# Patient Record
Sex: MALE | HISPANIC OR LATINO | Employment: FULL TIME | URBAN - NONMETROPOLITAN AREA
[De-identification: names, ages, dates, MRNs, and addresses within clinical notes are randomized per-mention and may not be internally consistent; named-entity substitution may affect disease eponyms.]

---

## 2022-04-18 ENCOUNTER — OFFICE VISIT (OUTPATIENT)
Dept: INTERNAL MEDICINE | Facility: CLINIC | Age: 22
End: 2022-04-18

## 2022-04-18 VITALS
HEIGHT: 68 IN | HEART RATE: 93 BPM | OXYGEN SATURATION: 98 % | DIASTOLIC BLOOD PRESSURE: 78 MMHG | BODY MASS INDEX: 23.01 KG/M2 | SYSTOLIC BLOOD PRESSURE: 160 MMHG | RESPIRATION RATE: 20 BRPM | TEMPERATURE: 98.4 F | WEIGHT: 151.8 LBS

## 2022-04-18 DIAGNOSIS — I10 HYPERTENSION, UNSPECIFIED TYPE: Primary | ICD-10-CM

## 2022-04-18 DIAGNOSIS — Z11.59 ENCOUNTER FOR HEPATITIS C SCREENING TEST FOR LOW RISK PATIENT: ICD-10-CM

## 2022-04-18 PROCEDURE — 99203 OFFICE O/P NEW LOW 30 MIN: CPT | Performed by: NURSE PRACTITIONER

## 2022-04-18 RX ORDER — HYDROCHLOROTHIAZIDE 25 MG/1
25 TABLET ORAL DAILY
Qty: 30 TABLET | Refills: 6 | Status: SHIPPED | OUTPATIENT
Start: 2022-04-18 | End: 2022-07-05

## 2022-04-18 NOTE — PROGRESS NOTES
Subjective     Chief Complaint   Patient presents with   • Establish Care       History of Present Illness  Patient presents today for establishment of care and high blood pressure readings. He has just been hired at [employer] and had  A preemployment screening. His blood pressure was found to be high. Patient reports that he does have high blood pressure when he goes to the doctor's office but will be normal when he gets home. He does have readings at home of 117-126 systolic. He denies any headaches, blurred vision, dizziness, or weakness. No chest pain. No posterior headaches.     Patient's PMR from outside medical facility reviewed and noted.    Review of Systems   Constitutional: Negative for appetite change, chills, diaphoresis, fatigue, fever and unexpected weight change.   HENT: Negative for congestion, ear pain, postnasal drip, sore throat and trouble swallowing.    Eyes: Negative for pain and visual disturbance.   Respiratory: Negative for cough, chest tightness, shortness of breath and wheezing.    Cardiovascular: Negative for chest pain, palpitations and leg swelling.   Gastrointestinal: Negative for abdominal pain, blood in stool, constipation, diarrhea, nausea and vomiting.   Endocrine: Negative for cold intolerance, heat intolerance, polydipsia, polyphagia and polyuria.   Genitourinary: Negative for difficulty urinating, dysuria, frequency and urgency.   Musculoskeletal: Negative for gait problem.   Skin: Negative for rash.   Neurological: Negative for dizziness, seizures, syncope, weakness and headaches.   Hematological: Does not bruise/bleed easily.   Psychiatric/Behavioral: Negative for confusion. The patient is not nervous/anxious.         Otherwise complete ROS reviewed and negative except as mentioned in the HPI.    Past Medical History: History reviewed. No pertinent past medical history.  Past Surgical History:History reviewed. No pertinent surgical history.  Social History:   "reports that he has never smoked. He has never used smokeless tobacco. He reports current alcohol use. He reports that he does not use drugs.    Family History: family history includes No Known Problems in his brother, father, and mother.       Allergies:  No Known Allergies  Medications:  Prior to Admission medications    Not on File       Objective     Vital Signs: /78   Pulse 93   Temp 98.4 °F (36.9 °C) (Temporal)   Resp 20   Ht 172.7 cm (68\")   Wt 68.9 kg (151 lb 12.8 oz)   SpO2 98%   BMI 23.08 kg/m²   Physical Exam  Vitals and nursing note reviewed.   Constitutional:       Appearance: He is well-developed.   HENT:      Head: Normocephalic and atraumatic.   Eyes:      Pupils: Pupils are equal, round, and reactive to light.   Neck:      Vascular: No JVD.   Cardiovascular:      Rate and Rhythm: Normal rate and regular rhythm.   Pulmonary:      Effort: Pulmonary effort is normal.   Abdominal:      General: Bowel sounds are normal.      Palpations: Abdomen is soft.   Musculoskeletal:         General: No deformity.      Cervical back: Normal range of motion and neck supple.   Lymphadenopathy:      Cervical: No cervical adenopathy.   Skin:     General: Skin is warm and dry.   Neurological:      Mental Status: He is alert and oriented to person, place, and time.   Psychiatric:         Behavior: Behavior normal.         Thought Content: Thought content normal.         Judgment: Judgment normal.         Patient's Body mass index is 23.08 kg/m². indicating that he is within normal range (BMI 18.5-24.9). No BMI management plan needed..      Results Reviewed:  No results found for: GLUCOSE, BUN, CREATININE, NA, K, CL, CO2, CALCIUM, ALT, AST, WBC, HCT, PLT, CHOL, TRIG, HDL, LDL, LDLHDL, HGBA1C      Assessment / Plan     Assessment/Plan:  Diagnoses and all orders for this visit:    1. Hypertension, unspecified type (Primary)  -     CBC & Differential  -     Comprehensive Metabolic Panel  -     TSH  -     T4, " free  -     hydroCHLOROthiazide (HYDRODIURIL) 25 MG tablet; Take 1 tablet by mouth Daily.  Dispense: 30 tablet; Refill: 6    2. Encounter for hepatitis C screening test for low risk patient  -     Hepatitis C Antibody        Return in about 1 month (around 5/18/2022). unless patient needs to be seen sooner or acute issues arise.    Code Status: Full.     I have discussed the patient results/orders and and plan/recommendation with them at today's visit.      Angie Matute, LONG   04/18/2022

## 2022-04-20 LAB
ALBUMIN SERPL-MCNC: 5 G/DL (ref 4.1–5.2)
ALBUMIN/GLOB SERPL: 1.5 {RATIO} (ref 1.2–2.2)
ALP SERPL-CCNC: 99 IU/L (ref 44–121)
ALT SERPL-CCNC: 30 IU/L (ref 0–44)
AST SERPL-CCNC: 17 IU/L (ref 0–40)
BASOPHILS # BLD AUTO: 0 X10E3/UL (ref 0–0.2)
BASOPHILS NFR BLD AUTO: 0 %
BILIRUB SERPL-MCNC: 0.3 MG/DL (ref 0–1.2)
BUN SERPL-MCNC: 12 MG/DL (ref 6–20)
BUN/CREAT SERPL: 17 (ref 9–20)
CALCIUM SERPL-MCNC: 9.9 MG/DL (ref 8.7–10.2)
CHLORIDE SERPL-SCNC: 99 MMOL/L (ref 96–106)
CO2 SERPL-SCNC: 23 MMOL/L (ref 20–29)
CREAT SERPL-MCNC: 0.71 MG/DL (ref 0.76–1.27)
EGFRCR SERPLBLD CKD-EPI 2021: 134 ML/MIN/1.73
EOSINOPHIL # BLD AUTO: 0.1 X10E3/UL (ref 0–0.4)
EOSINOPHIL NFR BLD AUTO: 1 %
ERYTHROCYTE [DISTWIDTH] IN BLOOD BY AUTOMATED COUNT: 13.1 % (ref 11.6–15.4)
GLOBULIN SER CALC-MCNC: 3.3 G/DL (ref 1.5–4.5)
GLUCOSE SERPL-MCNC: 87 MG/DL (ref 65–99)
HCT VFR BLD AUTO: 50.7 % (ref 37.5–51)
HCV AB S/CO SERPL IA: <0.1 S/CO RATIO (ref 0–0.9)
HGB BLD-MCNC: 16.5 G/DL (ref 13–17.7)
IMM GRANULOCYTES # BLD AUTO: 0 X10E3/UL (ref 0–0.1)
IMM GRANULOCYTES NFR BLD AUTO: 0 %
LYMPHOCYTES # BLD AUTO: 2.3 X10E3/UL (ref 0.7–3.1)
LYMPHOCYTES NFR BLD AUTO: 30 %
MCH RBC QN AUTO: 28.8 PG (ref 26.6–33)
MCHC RBC AUTO-ENTMCNC: 32.5 G/DL (ref 31.5–35.7)
MCV RBC AUTO: 89 FL (ref 79–97)
MONOCYTES # BLD AUTO: 0.4 X10E3/UL (ref 0.1–0.9)
MONOCYTES NFR BLD AUTO: 6 %
NEUTROPHILS # BLD AUTO: 4.8 X10E3/UL (ref 1.4–7)
NEUTROPHILS NFR BLD AUTO: 63 %
PLATELET # BLD AUTO: 318 X10E3/UL (ref 150–450)
POTASSIUM SERPL-SCNC: 4.3 MMOL/L (ref 3.5–5.2)
PROT SERPL-MCNC: 8.3 G/DL (ref 6–8.5)
RBC # BLD AUTO: 5.73 X10E6/UL (ref 4.14–5.8)
SODIUM SERPL-SCNC: 139 MMOL/L (ref 134–144)
T4 FREE SERPL-MCNC: 1.3 NG/DL (ref 0.82–1.77)
TSH SERPL DL<=0.005 MIU/L-ACNC: 1.27 UIU/ML (ref 0.45–4.5)
WBC # BLD AUTO: 7.7 X10E3/UL (ref 3.4–10.8)

## 2022-04-21 ENCOUNTER — TELEPHONE (OUTPATIENT)
Dept: INTERNAL MEDICINE | Facility: CLINIC | Age: 22
End: 2022-04-21

## 2022-04-21 ENCOUNTER — PATIENT ROUNDING (BHMG ONLY) (OUTPATIENT)
Dept: INTERNAL MEDICINE | Facility: CLINIC | Age: 22
End: 2022-04-21

## 2022-04-21 NOTE — PROGRESS NOTES
April 21, 2022    Hello, may I speak with Johnny Juni?    My name is Nora.    I am  with [facility]  [address] [place] [address] [phone].    Before we get started may I verify your date of birth? 2000    I am calling to officially welcome you to our practice and ask about your recent visit. Is this a good time to talk? PT did not answer. No VM    Tell me about your visit with us. What things went well?  ***       We're always looking for ways to make our patients' experiences even better. Do you have recommendations on ways we may improve?      Overall were you satisfied with your first visit to our practice?       I appreciate you taking the time to speak with me today. Is there anything else I can do for you?      Thank you, and have a great day.

## 2022-05-23 ENCOUNTER — OFFICE VISIT (OUTPATIENT)
Dept: INTERNAL MEDICINE | Facility: CLINIC | Age: 22
End: 2022-05-23

## 2022-05-23 VITALS
DIASTOLIC BLOOD PRESSURE: 83 MMHG | SYSTOLIC BLOOD PRESSURE: 136 MMHG | OXYGEN SATURATION: 99 % | RESPIRATION RATE: 18 BRPM | BODY MASS INDEX: 22.88 KG/M2 | TEMPERATURE: 98 F | HEIGHT: 68 IN | WEIGHT: 151 LBS | HEART RATE: 76 BPM

## 2022-05-23 DIAGNOSIS — I10 HYPERTENSION, UNSPECIFIED TYPE: Primary | ICD-10-CM

## 2022-05-23 PROCEDURE — 99213 OFFICE O/P EST LOW 20 MIN: CPT | Performed by: NURSE PRACTITIONER

## 2022-05-23 NOTE — PROGRESS NOTES
Subjective     Chief Complaint   Patient presents with   • Hypertension       History of Present Illness  Pt presents for a f/u r/t hypertension. He has not been checking his BP at home. He has been taking HCTZ 25mg daily. He has had no symptoms of increased BP. He has had belching and nausea with the HCTZ.     Review of Systems   Constitutional: Negative for appetite change, chills, diaphoresis, fatigue, fever and unexpected weight change.   HENT: Negative for congestion, ear pain, postnasal drip, sore throat and trouble swallowing.    Eyes: Negative for pain and visual disturbance.   Respiratory: Negative for cough, chest tightness, shortness of breath and wheezing.    Cardiovascular: Negative for chest pain, palpitations and leg swelling.   Gastrointestinal: Negative for abdominal pain, blood in stool, constipation, diarrhea, nausea and vomiting.   Endocrine: Negative for cold intolerance, heat intolerance, polydipsia, polyphagia and polyuria.   Genitourinary: Negative for difficulty urinating, dysuria, frequency and urgency.   Musculoskeletal: Negative for gait problem.   Skin: Negative for rash.   Neurological: Negative for dizziness, seizures, syncope, weakness and headaches.   Hematological: Does not bruise/bleed easily.   Psychiatric/Behavioral: Negative for confusion. The patient is not nervous/anxious.        Otherwise complete ROS reviewed.    Past Medical History: History reviewed. No pertinent past medical history.  Past Surgical History:History reviewed. No pertinent surgical history.  Social History:  reports that he has never smoked. He has never used smokeless tobacco. He reports current alcohol use. He reports that he does not use drugs.    Family History: family history includes No Known Problems in his brother, father, and mother.       Allergies:  No Known Allergies  Medications:  Prior to Admission medications    Medication Sig Start Date End Date Taking? Authorizing Provider  "  hydroCHLOROthiazide (HYDRODIURIL) 25 MG tablet Take 1 tablet by mouth Daily. 4/18/22   Angie Matute APRN       Objective     Vital Signs: /83 (BP Location: Right arm, Patient Position: Sitting, Cuff Size: Adult)   Pulse 76   Temp 98 °F (36.7 °C)   Resp 18   Ht 172.7 cm (68\")   Wt 68.5 kg (151 lb)   SpO2 99%   BMI 22.96 kg/m²   Physical Exam  Vitals reviewed.   Constitutional:       Appearance: He is well-developed.   HENT:      Head: Normocephalic and atraumatic.   Eyes:      Pupils: Pupils are equal, round, and reactive to light.   Neck:      Vascular: No JVD.   Cardiovascular:      Rate and Rhythm: Normal rate and regular rhythm.      Heart sounds: Normal heart sounds.   Pulmonary:      Effort: Pulmonary effort is normal.      Breath sounds: Normal breath sounds.   Abdominal:      General: Bowel sounds are normal.      Palpations: Abdomen is soft.   Musculoskeletal:         General: No deformity.      Cervical back: Normal range of motion and neck supple.   Lymphadenopathy:      Cervical: No cervical adenopathy.   Skin:     General: Skin is warm and dry.   Neurological:      Mental Status: He is alert and oriented to person, place, and time.   Psychiatric:         Behavior: Behavior normal.         Thought Content: Thought content normal.         Judgment: Judgment normal.       BMI is within normal parameters. No other follow-up for BMI required.      Results Reviewed:  Glucose   Date Value Ref Range Status   04/18/2022 87 65 - 99 mg/dL Final     BUN   Date Value Ref Range Status   04/18/2022 12 6 - 20 mg/dL Final     Creatinine   Date Value Ref Range Status   04/18/2022 0.71 (L) 0.76 - 1.27 mg/dL Final     Sodium   Date Value Ref Range Status   04/18/2022 139 134 - 144 mmol/L Final     Potassium   Date Value Ref Range Status   04/18/2022 4.3 3.5 - 5.2 mmol/L Final     Chloride   Date Value Ref Range Status   04/18/2022 99 96 - 106 mmol/L Final     Total CO2   Date Value Ref Range " Status   04/18/2022 23 20 - 29 mmol/L Final     Calcium   Date Value Ref Range Status   04/18/2022 9.9 8.7 - 10.2 mg/dL Final     ALT (SGPT)   Date Value Ref Range Status   04/18/2022 30 0 - 44 IU/L Final     AST (SGOT)   Date Value Ref Range Status   04/18/2022 17 0 - 40 IU/L Final     WBC   Date Value Ref Range Status   04/18/2022 7.7 3.4 - 10.8 x10E3/uL Final     Hematocrit   Date Value Ref Range Status   04/18/2022 50.7 37.5 - 51.0 % Final     Platelets   Date Value Ref Range Status   04/18/2022 318 150 - 450 x10E3/uL Final         Assessment / Plan     Assessment/Plan:  Diagnoses and all orders for this visit:    1. Hypertension, unspecified type (Primary)     Will see how he does off medication for 2 weeks, may need to look into ACE/ARB if it is elevated.     Code Status: Full.    I have discussed the patient results/orders and and plan/recommendation with them at today's visit.      Angie Matute, APRN   05/23/2022

## 2022-06-06 ENCOUNTER — TELEPHONE (OUTPATIENT)
Dept: INTERNAL MEDICINE | Facility: CLINIC | Age: 22
End: 2022-06-06

## 2022-06-06 RX ORDER — LISINOPRIL 2.5 MG/1
2.5 TABLET ORAL DAILY
Qty: 30 TABLET | Refills: 1 | Status: SHIPPED | OUTPATIENT
Start: 2022-06-06 | End: 2022-07-05 | Stop reason: SDUPTHER

## 2022-06-06 NOTE — TELEPHONE ENCOUNTER
Caller: Johnny Alfredo    Relationship: Self    Best call back number: [phone]    What is the best time to reach you: ANY    Who are you requesting to speak with (clinical staff, provider,  specific staff member): CLINICAL STAFF        What was the call regarding:  THE PATIENT CALLED AND STATES THAT HE HAS STOPPED TAKING THE HYDROCHLOROTHIAZIDE ( HYDRODIURIL) 25 MG TABLET PER [facility] [employer] (5/22/22). THE PATIENT CALLED TO REPORT HIS BP READINGS: 5/27/22 /83  6/1/22 /96  6/3/22/ BP/ 140/95  6/6/22 132/80    Do you require a callback: YES

## 2022-07-01 RX ORDER — LISINOPRIL 2.5 MG/1
TABLET ORAL
Qty: 30 TABLET | Refills: 1 | OUTPATIENT
Start: 2022-07-01

## 2022-07-01 NOTE — TELEPHONE ENCOUNTER
Rx Refill Note  Requested Prescriptions     Refused Prescriptions Disp Refills   • lisinopril (PRINIVIL,ZESTRIL) 2.5 MG tablet [Pharmacy Med Name: LISINOPRIL 2.5 MG TABLET] 30 tablet 1     Sig: TAKE 1 TABLET BY MOUTH EVERY DAY      Last office visit with prescribing clinician: 5/23/2022      Next office visit with prescribing clinician: 7/5/2022     Refill left on Rx.        Charmaine Simon MA  07/01/22, 11:34 CDT

## 2022-07-05 ENCOUNTER — OFFICE VISIT (OUTPATIENT)
Dept: INTERNAL MEDICINE | Facility: CLINIC | Age: 22
End: 2022-07-05

## 2022-07-05 VITALS
TEMPERATURE: 98.7 F | HEIGHT: 68 IN | SYSTOLIC BLOOD PRESSURE: 148 MMHG | DIASTOLIC BLOOD PRESSURE: 78 MMHG | BODY MASS INDEX: 22.88 KG/M2 | RESPIRATION RATE: 18 BRPM | WEIGHT: 151 LBS | OXYGEN SATURATION: 99 % | HEART RATE: 69 BPM

## 2022-07-05 DIAGNOSIS — I10 HYPERTENSION, UNSPECIFIED TYPE: Primary | ICD-10-CM

## 2022-07-05 PROCEDURE — 99213 OFFICE O/P EST LOW 20 MIN: CPT | Performed by: NURSE PRACTITIONER

## 2022-07-05 RX ORDER — LISINOPRIL 10 MG/1
10 TABLET ORAL DAILY
Qty: 30 TABLET | Refills: 2 | Status: SHIPPED | OUTPATIENT
Start: 2022-07-05 | End: 2022-10-03

## 2022-07-05 NOTE — PROGRESS NOTES
Answers for HPI/ROS submitted by the patient on 7/2/2022  What is the primary reason for your visit?: High Blood Pressure  Chronicity: recurrent  Onset: more than 1 month ago  Progression since onset: gradually improving  Condition status: controlled  anxiety: No  blurred vision: No  chest pain: No  headaches: No  malaise/fatigue: No  neck pain: No  orthopnea: No  palpitations: No  peripheral edema: No  PND: No  shortness of breath: No  sweats: No  Agents associated with hypertension: no associated agents  CAD risks: no known risk factors  Compliance problems: no compliance problems            Subjective     Chief Complaint   Patient presents with   • Hypertension       History of Present Illness  Patient comes in today to follow-up on hypertension.  He states his blood pressure has still been running high.  He has not been checking it very often.  He has stopped the hydrochlorothiazide given GI side effects we will start him on lisinopril 2.5 mg.  Patient denies any current symptoms.  Apparently he was diagnosed with COVID about 8 or 9 days ago and is asymptomatic.    Patient's PMR from outside medical facility reviewed and noted.    Review of Systems   Otherwise complete ROS reviewed and negative except as mentioned in the HPI.    Past Medical History: No past medical history on file.  Past Surgical History:No past surgical history on file.  Social History:  reports that he has never smoked. He has never used smokeless tobacco. He reports current alcohol use. He reports that he does not use drugs.    Family History: family history includes No Known Problems in his brother, father, and mother.      Allergies:  No Known Allergies  Medications:  Prior to Admission medications    Medication Sig Start Date End Date Taking? Authorizing Provider   lisinopril (PRINIVIL,ZESTRIL) 10 MG tablet Take 1 tablet by mouth Daily. 7/5/22   Angie Matute APRN   hydroCHLOROthiazide (HYDRODIURIL) 25 MG tablet Take 1 tablet  "by mouth Daily. 4/18/22 7/5/22  Angie Matute APRN   lisinopril (Zestril) 2.5 MG tablet Take 1 tablet by mouth Daily. 6/6/22 7/5/22  Angie Matute APRN       Objective     Vital Signs: /78   Pulse 69   Temp 98.7 °F (37.1 °C)   Resp 18   Ht 172.7 cm (68\")   Wt 68.5 kg (151 lb)   SpO2 99%   BMI 22.96 kg/m²   Physical Exam  Vitals reviewed.   Constitutional:       Appearance: He is well-developed.   HENT:      Head: Normocephalic and atraumatic.   Eyes:      Pupils: Pupils are equal, round, and reactive to light.   Neck:      Vascular: No JVD.   Cardiovascular:      Rate and Rhythm: Normal rate and regular rhythm.   Pulmonary:      Effort: Pulmonary effort is normal.   Abdominal:      General: Bowel sounds are normal.      Palpations: Abdomen is soft.   Musculoskeletal:         General: No deformity.      Cervical back: Normal range of motion and neck supple.   Lymphadenopathy:      Cervical: No cervical adenopathy.   Skin:     General: Skin is warm and dry.   Neurological:      Mental Status: He is alert and oriented to person, place, and time.   Psychiatric:         Behavior: Behavior normal.         Thought Content: Thought content normal.         Judgment: Judgment normal.         BMI is within normal parameters. No other follow-up for BMI required.      Results Reviewed:  Glucose   Date Value Ref Range Status   04/18/2022 87 65 - 99 mg/dL Final     BUN   Date Value Ref Range Status   04/18/2022 12 6 - 20 mg/dL Final     Creatinine   Date Value Ref Range Status   04/18/2022 0.71 (L) 0.76 - 1.27 mg/dL Final     Sodium   Date Value Ref Range Status   04/18/2022 139 134 - 144 mmol/L Final     Potassium   Date Value Ref Range Status   04/18/2022 4.3 3.5 - 5.2 mmol/L Final     Chloride   Date Value Ref Range Status   04/18/2022 99 96 - 106 mmol/L Final     Total CO2   Date Value Ref Range Status   04/18/2022 23 20 - 29 mmol/L Final     Calcium   Date Value Ref Range Status   04/18/2022 " 9.9 8.7 - 10.2 mg/dL Final     ALT (SGPT)   Date Value Ref Range Status   04/18/2022 30 0 - 44 IU/L Final     AST (SGOT)   Date Value Ref Range Status   04/18/2022 17 0 - 40 IU/L Final     WBC   Date Value Ref Range Status   04/18/2022 7.7 3.4 - 10.8 x10E3/uL Final     Hematocrit   Date Value Ref Range Status   04/18/2022 50.7 37.5 - 51.0 % Final     Platelets   Date Value Ref Range Status   04/18/2022 318 150 - 450 x10E3/uL Final         Assessment / Plan     Assessment/Plan:  Diagnoses and all orders for this visit:    1. Hypertension, unspecified type (Primary)  -     lisinopril (PRINIVIL,ZESTRIL) 10 MG tablet; Take 1 tablet by mouth Daily.  Dispense: 30 tablet; Refill: 2    He states to me a [employer] message with updated blood pressures in the next week.    No follow-ups on file. unless patient needs to be seen sooner or acute issues arise.    Code Status: Full.     I have discussed the patient results/orders and and plan/recommendation with them at today's visit.      Angie Matute, LONG   07/05/2022

## 2022-10-01 DIAGNOSIS — I10 HYPERTENSION, UNSPECIFIED TYPE: ICD-10-CM

## 2022-10-03 RX ORDER — LISINOPRIL 10 MG/1
TABLET ORAL
Qty: 90 TABLET | Refills: 1 | Status: SHIPPED | OUTPATIENT
Start: 2022-10-03

## 2022-10-03 NOTE — TELEPHONE ENCOUNTER
Rx Refill Note  Requested Prescriptions     Pending Prescriptions Disp Refills   • lisinopril (PRINIVIL,ZESTRIL) 10 MG tablet [Pharmacy Med Name: LISINOPRIL 10 MG TABLET] 90 tablet      Sig: TAKE 1 TABLET BY MOUTH EVERY DAY      Last office visit with prescribing clinician: 7/5/2022      Next office visit with prescribing clinician: Visit date not found            Becky Myers RN  10/03/22, 07:49 CDT

## 2023-01-24 ENCOUNTER — OFFICE VISIT (OUTPATIENT)
Dept: INTERNAL MEDICINE | Facility: CLINIC | Age: 23
End: 2023-01-24
Payer: COMMERCIAL

## 2023-01-24 VITALS
SYSTOLIC BLOOD PRESSURE: 138 MMHG | HEIGHT: 68 IN | HEART RATE: 78 BPM | TEMPERATURE: 97.8 F | BODY MASS INDEX: 24.55 KG/M2 | DIASTOLIC BLOOD PRESSURE: 79 MMHG | RESPIRATION RATE: 20 BRPM | OXYGEN SATURATION: 100 % | WEIGHT: 162 LBS

## 2023-01-24 DIAGNOSIS — I10 HYPERTENSION, UNSPECIFIED TYPE: ICD-10-CM

## 2023-01-24 DIAGNOSIS — R10.13 DYSPEPSIA: Primary | ICD-10-CM

## 2023-01-24 DIAGNOSIS — N48.9 LESION OF PENIS: ICD-10-CM

## 2023-01-24 PROCEDURE — 99214 OFFICE O/P EST MOD 30 MIN: CPT | Performed by: NURSE PRACTITIONER

## 2023-01-24 NOTE — PROGRESS NOTES
"        Subjective     Chief Complaint   Patient presents with   • Heartburn       History of Present Illness  The patient reports intense, persistent heartburn on 01/21/2023. He denies experiencing heartburn previously. He reports that tums only provided temporary relief. He reports eating black pepper which he suspects could have been the cause of his heartburn. He states he has been taking creatine and he has gained weight. He reports creatine making him feel \"kind of weird\", and he disliked using it. He states he was going to the gym but stopped because he didn't feel good while exercising there. He suspects this could have been due to hypotension caused by his blood pressure medication which he reports only taking for approximately 3 months. He discontinued his blood pressure medication several months ago and reports feeling better since doing so. He states he did regurgitate into his mouth one time, but it has not happened since. He inquires if whey protein will negatively impact his blood pressure.    Patient's PMR from outside medical facility reviewed and noted.    Review of Systems   Constitutional: Negative for appetite change, chills, diaphoresis, fatigue, fever and unexpected weight change.   HENT: Negative for congestion, ear pain, postnasal drip, sore throat and trouble swallowing.    Eyes: Negative for pain and visual disturbance.   Respiratory: Negative for cough, chest tightness, shortness of breath and wheezing.    Cardiovascular: Negative for chest pain, palpitations and leg swelling.   Gastrointestinal: Negative for abdominal pain, blood in stool, constipation, diarrhea, nausea and vomiting.   Endocrine: Negative for cold intolerance, heat intolerance, polydipsia, polyphagia and polyuria.   Genitourinary: Negative for difficulty urinating, dysuria, frequency and urgency.   Musculoskeletal: Negative for gait problem.   Skin: Negative for rash.   Neurological: Negative for dizziness, seizures, " "syncope, weakness and headaches.   Hematological: Does not bruise/bleed easily.   Psychiatric/Behavioral: Negative for confusion. The patient is not nervous/anxious.    All other systems reviewed and are negative.       Otherwise complete ROS reviewed and negative except as mentioned in the HPI.    Past Medical History:   Past Medical History:   Diagnosis Date   • Hypertension      Past Surgical History:History reviewed. No pertinent surgical history.  Social History:  reports that he has never smoked. He has never used smokeless tobacco. He reports current alcohol use. He reports that he does not use drugs.    Family History: family history includes No Known Problems in his brother, father, and mother.     Allergies:  No Known Allergies  Medications:  Prior to Admission medications    Medication Sig Start Date End Date Taking? Authorizing Provider   lisinopril (PRINIVIL,ZESTRIL) 10 MG tablet TAKE 1 TABLET BY MOUTH EVERY DAY 10/3/22   Angie Matute, LONG       Objective     Vital Signs: /79   Pulse 78   Temp 97.8 °F (36.6 °C)   Resp 20   Ht 172.7 cm (68\")   Wt 73.5 kg (162 lb)   SpO2 100%   BMI 24.63 kg/m²      Physical Exam  Constitutional:       Appearance: He is well-developed.   HENT:      Head: Normocephalic and atraumatic.   Eyes:      Pupils: Pupils are equal, round, and reactive to light.   Neck:      Thyroid: No thyromegaly.      Vascular: No JVD.      Trachea: No tracheal deviation.   Cardiovascular:      Rate and Rhythm: Normal rate and regular rhythm.      Heart sounds: Normal heart sounds. No murmur heard.    No friction rub. No gallop.   Pulmonary:      Effort: Pulmonary effort is normal. No respiratory distress.      Breath sounds: Normal breath sounds. No wheezing or rales.   Chest:      Chest wall: No tenderness.   Abdominal:      General: There is no distension.      Palpations: Abdomen is soft.      Tenderness: There is no abdominal tenderness.   Musculoskeletal:         " General: Normal range of motion.      Cervical back: Normal range of motion and neck supple.   Lymphadenopathy:      Cervical: No cervical adenopathy.   Skin:     General: Skin is warm and dry.   Neurological:      Mental Status: He is alert and oriented to person, place, and time.      Cranial Nerves: No cranial nerve deficit.       BMI is within normal parameters. No other follow-up for BMI required.      Results Reviewed:  Glucose   Date Value Ref Range Status   04/18/2022 87 65 - 99 mg/dL Final     BUN   Date Value Ref Range Status   04/18/2022 12 6 - 20 mg/dL Final     Creatinine   Date Value Ref Range Status   04/18/2022 0.71 (L) 0.76 - 1.27 mg/dL Final     Sodium   Date Value Ref Range Status   04/18/2022 139 134 - 144 mmol/L Final     Potassium   Date Value Ref Range Status   04/18/2022 4.3 3.5 - 5.2 mmol/L Final     Chloride   Date Value Ref Range Status   04/18/2022 99 96 - 106 mmol/L Final     Total CO2   Date Value Ref Range Status   04/18/2022 23 20 - 29 mmol/L Final     Calcium   Date Value Ref Range Status   04/18/2022 9.9 8.7 - 10.2 mg/dL Final     ALT (SGPT)   Date Value Ref Range Status   04/18/2022 30 0 - 44 IU/L Final     AST (SGOT)   Date Value Ref Range Status   04/18/2022 17 0 - 40 IU/L Final     WBC   Date Value Ref Range Status   04/18/2022 7.7 3.4 - 10.8 x10E3/uL Final     Hematocrit   Date Value Ref Range Status   04/18/2022 50.7 37.5 - 51.0 % Final     Platelets   Date Value Ref Range Status   04/18/2022 318 150 - 450 x10E3/uL Final         Assessment / Plan     Assessment/Plan:  Diagnoses and all orders for this visit:    1. Dyspepsia (Primary)  - He is advised to discontinue use of creatine.    2. Lesion of penis  -     Herpes [employer] Typing - Urine, Urine, Clean Catch    3. Hypertension, unspecified type  - He is instructed to monitor blood his pressure at home and report his readings.      Return if symptoms worsen or fail to improve. unless patient needs to be seen sooner or acute  issues arise.    Code Status: Full    I have discussed the patient results/orders and and plan/recommendation with them at today's visit.      Transcribed from ambient dictation for LONG Serra by Carlos Red.  01/24/23   17:09 CST    Patient or patient representative verbalized consent to the visit recording.  I have personally performed the services described in this document as transcribed by the above individual, and it is both accurate and complete.  Angie Matute, LONG  1/27/2023  07:47 CST    LONG Serra   01/24/2023

## 2023-01-27 LAB
HSV SPEC CULT: NORMAL
SPECIMEN STATUS: NORMAL

## 2023-01-30 ENCOUNTER — CLINICAL SUPPORT (OUTPATIENT)
Dept: INTERNAL MEDICINE | Facility: CLINIC | Age: 23
End: 2023-01-30
Payer: COMMERCIAL

## 2023-01-30 DIAGNOSIS — N48.9 LESION OF PENIS: ICD-10-CM

## 2023-01-30 DIAGNOSIS — N48.9 LESION OF PENIS: Primary | ICD-10-CM

## 2023-02-02 LAB — HSV SPEC CULT: ABNORMAL

## 2023-02-03 NOTE — PROGRESS NOTES
Called pt with results and recommendations. Discussed the importance of protection to prevent transmission. Patient voiced understanding to all.

## 2023-02-07 RX ORDER — VALACYCLOVIR HYDROCHLORIDE 1 G/1
1000 TABLET, FILM COATED ORAL 2 TIMES DAILY
Qty: 20 TABLET | Refills: 0 | Status: SHIPPED | OUTPATIENT
Start: 2023-02-07

## 2023-03-10 ENCOUNTER — PATIENT MESSAGE (OUTPATIENT)
Dept: INTERNAL MEDICINE | Facility: CLINIC | Age: 23
End: 2023-03-10
Payer: COMMERCIAL

## 2023-04-06 ENCOUNTER — OFFICE VISIT (OUTPATIENT)
Dept: INTERNAL MEDICINE | Facility: CLINIC | Age: 23
End: 2023-04-06
Payer: COMMERCIAL

## 2023-04-06 VITALS
TEMPERATURE: 97.8 F | WEIGHT: 160 LBS | BODY MASS INDEX: 24.25 KG/M2 | HEART RATE: 83 BPM | OXYGEN SATURATION: 99 % | DIASTOLIC BLOOD PRESSURE: 88 MMHG | RESPIRATION RATE: 19 BRPM | SYSTOLIC BLOOD PRESSURE: 137 MMHG | HEIGHT: 68 IN

## 2023-04-06 DIAGNOSIS — B97.7 HPV IN MALE: Primary | ICD-10-CM

## 2023-04-06 RX ORDER — IMIQUIMOD 12.5 MG/.25G
1 CREAM TOPICAL 3 TIMES WEEKLY
Qty: 24 EACH | Refills: 3 | Status: SHIPPED | OUTPATIENT
Start: 2023-04-07

## 2023-04-10 NOTE — PROGRESS NOTES
"        Subjective     Chief Complaint   Patient presents with   • Genital Warts       History of Present Illness  Pt comes in today with ongoing penile lesions. He has been treated with Valtrex and Apple Cider Vinegar. Despite, this, the lesions persist. No focal irritation. He is using a condom for intercourse. No lesions on the glans.     Patient's PMR from outside medical facility reviewed and noted.    Review of Systems   Otherwise complete ROS reviewed and negative except as mentioned in the HPI.    Past Medical History:   Past Medical History:   Diagnosis Date   • Hypertension      Past Surgical History:No past surgical history on file.  Social History:  reports that he has never smoked. He has never used smokeless tobacco. He reports current alcohol use. He reports that he does not use drugs.    Family History: family history includes No Known Problems in his brother, father, and mother.      Allergies:  No Known Allergies  Medications:  Prior to Admission medications    Medication Sig Start Date End Date Taking? Authorizing Provider   imiquimod (Aldara) 5 % cream Apply 1 application topically to the appropriate area as directed 3 (Three) Times a Week. 4/7/23   Angie Matute APRN   lisinopril (PRINIVIL,ZESTRIL) 10 MG tablet TAKE 1 TABLET BY MOUTH EVERY DAY 10/3/22   Angie Matute APRN   valACYclovir (Valtrex) 1000 MG tablet Take 1 tablet by mouth 2 (Two) Times a Day.  Patient not taking: Reported on 4/6/2023 2/7/23   Angie Matute APRN       Objective     Vital Signs: /88   Pulse 83   Temp 97.8 °F (36.6 °C)   Resp 19   Ht 172.7 cm (68\")   Wt 72.6 kg (160 lb)   SpO2 99%   BMI 24.33 kg/m²   Physical Exam  Genitourinary:     Penis: Uncircumcised. Lesions:  multiple lesion circumferentially around shaft of penis.           BMI is within normal parameters. No other follow-up for BMI required.  Results Reviewed:  Glucose   Date Value Ref Range Status   04/18/2022 87 65 " - 99 mg/dL Final     BUN   Date Value Ref Range Status   04/18/2022 12 6 - 20 mg/dL Final     Creatinine   Date Value Ref Range Status   04/18/2022 0.71 (L) 0.76 - 1.27 mg/dL Final     Sodium   Date Value Ref Range Status   04/18/2022 139 134 - 144 mmol/L Final     Potassium   Date Value Ref Range Status   04/18/2022 4.3 3.5 - 5.2 mmol/L Final     Chloride   Date Value Ref Range Status   04/18/2022 99 96 - 106 mmol/L Final     Total CO2   Date Value Ref Range Status   04/18/2022 23 20 - 29 mmol/L Final     Calcium   Date Value Ref Range Status   04/18/2022 9.9 8.7 - 10.2 mg/dL Final     ALT (SGPT)   Date Value Ref Range Status   04/18/2022 30 0 - 44 IU/L Final     AST (SGOT)   Date Value Ref Range Status   04/18/2022 17 0 - 40 IU/L Final     WBC   Date Value Ref Range Status   04/18/2022 7.7 3.4 - 10.8 x10E3/uL Final     Hematocrit   Date Value Ref Range Status   04/18/2022 50.7 37.5 - 51.0 % Final     Platelets   Date Value Ref Range Status   04/18/2022 318 150 - 450 x10E3/uL Final         Assessment / Plan     Assessment/Plan:  Diagnoses and all orders for this visit:    1. 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  Dispense: 24 each; Refill: 3    Dr. Fields with urology came in the room and examined his lesions noting they are consistent with HPV. If the cream does not work, will need dermatology referral.     Return for Annual physical. unless patient needs to be seen sooner or acute issues arise.    Code Status: Full.     I have discussed the patient results/orders and and plan/recommendation with them at today's visit.      Angie Matute, LONG   04/06/2023

## 2023-10-17 ENCOUNTER — TELEPHONE (OUTPATIENT)
Dept: INTERNAL MEDICINE | Facility: CLINIC | Age: 23
End: 2023-10-17

## 2023-10-17 NOTE — TELEPHONE ENCOUNTER
Caller: Johnny Alfredo    Relationship: Self    Best call back number: [phone]     What orders are you requesting (i.e. lab or imaging): H PYLORI STOOL TEST    In what timeframe would the patient need to come in: AS SOON AS POSSIBLE    Where will you receive your lab/imaging services: WHEREVER    Additional notes: PATIENT STATES THAT HE NEEDS TO BE TESTED FOR H PYLORI AND WANTS A STOOL TEST DONE

## 2023-10-24 ENCOUNTER — OFFICE VISIT (OUTPATIENT)
Dept: INTERNAL MEDICINE | Facility: CLINIC | Age: 23
End: 2023-10-24
Payer: COMMERCIAL

## 2023-10-24 VITALS
BODY MASS INDEX: 24.49 KG/M2 | DIASTOLIC BLOOD PRESSURE: 80 MMHG | RESPIRATION RATE: 20 BRPM | HEIGHT: 68 IN | TEMPERATURE: 98.6 F | SYSTOLIC BLOOD PRESSURE: 144 MMHG | HEART RATE: 65 BPM | WEIGHT: 161.6 LBS | OXYGEN SATURATION: 99 %

## 2023-10-24 DIAGNOSIS — R10.13 DYSPEPSIA: ICD-10-CM

## 2023-10-24 DIAGNOSIS — R10.13 EPIGASTRIC PAIN: Primary | ICD-10-CM

## 2023-10-24 PROCEDURE — 99212 OFFICE O/P EST SF 10 MIN: CPT | Performed by: NURSE PRACTITIONER

## 2023-10-24 RX ORDER — FAMOTIDINE 40 MG/1
40 TABLET, FILM COATED ORAL DAILY
Qty: 90 TABLET | Refills: 1 | Status: SHIPPED | OUTPATIENT
Start: 2023-10-24

## 2023-10-24 NOTE — PROGRESS NOTES
"        Subjective     Chief Complaint   Patient presents with    GI Problem       History of Present Illness  Pt comes in today for complaints of abdominal pain. States epigastric pain belching and heartburn. Onset was about 6 months ago. Denies any excessive flatus but admits to excessive belching. States using TUMS which helps. Worse with hot food, spicy foods, and tomato based products. Admits to no caffeine. Only drinks water. He is not taking his blood pressure medications and states he has been checking his blood pressure at home and it is running 120-130. Fiancee with h. Pylori.     Review of Systems   Otherwise complete ROS reviewed and negative except as mentioned in the HPI.    Past Medical History:   Past Medical History:   Diagnosis Date    Hypertension      Past Surgical History:No past surgical history on file.  Social History:  reports that he has never smoked. He has never used smokeless tobacco. He reports current alcohol use. He reports that he does not use drugs.    Family History: family history includes No Known Problems in his brother, father, and mother.       Allergies:  No Known Allergies  Medications:  Prior to Admission medications    Medication Sig Start Date End Date Taking? Authorizing Provider   imiquimod (Aldara) 5 % cream Apply 1 application topically to the appropriate area as directed 3 (Three) Times a Week.  Patient not taking: Reported on 10/24/2023 4/7/23   Angie Matute APRN   lisinopril (PRINIVIL,ZESTRIL) 10 MG tablet TAKE 1 TABLET BY MOUTH EVERY DAY  Patient not taking: Reported on 10/24/2023 10/3/22   Angie Matute APRN   valACYclovir (Valtrex) 1000 MG tablet Take 1 tablet by mouth 2 (Two) Times a Day.  Patient not taking: Reported on 4/6/2023 2/7/23   Angie Matute APRN       Objective     Vital Signs: /80   Pulse 65   Temp 98.6 °F (37 °C)   Resp 20   Ht 172.7 cm (68\")   Wt 73.3 kg (161 lb 9.6 oz)   SpO2 99%   BMI 24.57 kg/m² "   Physical Exam  Vitals reviewed.   Constitutional:       Appearance: Normal appearance. He is well-developed.   HENT:      Head: Normocephalic and atraumatic.   Eyes:      Pupils: Pupils are equal, round, and reactive to light.   Neck:      Vascular: No JVD.   Cardiovascular:      Rate and Rhythm: Normal rate and regular rhythm.   Pulmonary:      Effort: Pulmonary effort is normal.   Abdominal:      General: Bowel sounds are normal.      Palpations: Abdomen is soft.   Musculoskeletal:         General: No deformity.      Cervical back: Normal range of motion and neck supple.   Lymphadenopathy:      Cervical: No cervical adenopathy.   Skin:     General: Skin is warm and dry.   Neurological:      Mental Status: He is alert and oriented to person, place, and time.   Psychiatric:         Behavior: Behavior normal.         Thought Content: Thought content normal.         Judgment: Judgment normal.         BMI is within normal parameters. No other follow-up for BMI required.      Results Reviewed:  Glucose   Date Value Ref Range Status   04/18/2022 87 65 - 99 mg/dL Final     BUN   Date Value Ref Range Status   04/18/2022 12 6 - 20 mg/dL Final     Creatinine   Date Value Ref Range Status   04/18/2022 0.71 (L) 0.76 - 1.27 mg/dL Final     Sodium   Date Value Ref Range Status   04/18/2022 139 134 - 144 mmol/L Final     Potassium   Date Value Ref Range Status   04/18/2022 4.3 3.5 - 5.2 mmol/L Final     Chloride   Date Value Ref Range Status   04/18/2022 99 96 - 106 mmol/L Final     Total CO2   Date Value Ref Range Status   04/18/2022 23 20 - 29 mmol/L Final     Calcium   Date Value Ref Range Status   04/18/2022 9.9 8.7 - 10.2 mg/dL Final     ALT (SGPT)   Date Value Ref Range Status   04/18/2022 30 0 - 44 IU/L Final     AST (SGOT)   Date Value Ref Range Status   04/18/2022 17 0 - 40 IU/L Final     WBC   Date Value Ref Range Status   04/18/2022 7.7 3.4 - 10.8 x10E3/uL Final     Hematocrit   Date Value Ref Range Status    04/18/2022 50.7 37.5 - 51.0 % Final     Platelets   Date Value Ref Range Status   04/18/2022 318 150 - 450 x10E3/uL Final         Assessment / Plan     Assessment/Plan:  Diagnoses and all orders for this visit:    1. Epigastric pain (Primary)  -     famotidine (Pepcid) 40 MG tablet; Take 1 tablet by mouth Daily.  Dispense: 90 tablet; Refill: 1    2. Dyspepsia  -     famotidine (Pepcid) 40 MG tablet; Take 1 tablet by mouth Daily.  Dispense: 90 tablet; Refill: 1      I have asked for the urea breath test to be done. Will have to be done at later date as we do not have the test here in clinic.     No follow-ups on file. unless patient needs to be seen sooner or acute issues arise.    Code Status: Full.     I have discussed the patient results/orders and and plan/recommendation with them at today's visit.      Angie Matute, APRN   10/24/2023

## 2023-10-25 ENCOUNTER — CLINICAL SUPPORT (OUTPATIENT)
Dept: INTERNAL MEDICINE | Facility: CLINIC | Age: 23
End: 2023-10-25
Payer: COMMERCIAL

## 2023-10-25 DIAGNOSIS — R10.13 EPIGASTRIC PAIN: Primary | ICD-10-CM

## 2023-10-28 LAB — UREA BREATH TEST QL: POSITIVE

## 2023-10-30 DIAGNOSIS — A04.8 HELICOBACTER PYLORI INFECTION: ICD-10-CM

## 2023-10-30 DIAGNOSIS — R10.13 EPIGASTRIC PAIN: Primary | ICD-10-CM

## 2023-10-30 RX ORDER — LANSOPRAZOLE, AMOXICILLIN, CLARITHROMYCIN 30-500-500
KIT ORAL 2 TIMES DAILY
Qty: 1 EACH | Refills: 0 | Status: SHIPPED | OUTPATIENT
Start: 2023-10-30 | End: 2023-11-03

## 2023-10-31 NOTE — PROGRESS NOTES
Fall Prevention in the Home, Adult  Falls can cause injuries and can affect people from all age groups. There are many simple things that you can do to make your home safe and to help prevent falls. Ask for help when making these changes, if needed.  What actions can I take to prevent falls?  General instructions  · Use good lighting in all rooms. Replace any light bulbs that burn out.  · Turn on lights if it is dark. Use night-lights.  · Place frequently used items in easy-to-reach places. Lower the shelves around your home if necessary.  · Set up furniture so that there are clear paths around it. Avoid moving your furniture around.  · Remove throw rugs and other tripping hazards from the floor.  · Avoid walking on wet floors.  · Fix any uneven floor surfaces.  · Add color or contrast paint or tape to grab bars and handrails in your home. Place contrasting color strips on the first and last steps of stairways.  · When you use a stepladder, make sure that it is completely opened and that the sides are firmly locked. Have someone hold the ladder while you are using it. Do not climb a closed stepladder.  · Be aware of any and all pets.  What can I do in the bathroom?         · Keep the floor dry. Immediately clean up any water that spills onto the floor.  · Remove soap buildup in the tub or shower on a regular basis.  · Use non-skid mats or decals on the floor of the tub or shower.  · Attach bath mats securely with double-sided, non-slip rug tape.  · If you need to sit down while you are in the shower, use a plastic, non-slip stool.  · Install grab bars by the toilet and in the tub and shower. Do not use towel bars as grab bars.  What can I do in the bedroom?  · Make sure that a bedside light is easy to reach.  · Do not use oversized bedding that drapes onto the floor.  · Have a firm chair that has side arms to use for getting dressed.  What can I do in the kitchen?  · Clean up any spills right away.  · If you need to  Patient presents for H.Pylori breath test. Specimen completed. Patient tolerated well.    reach for something above you, use a sturdy step stool that has a grab bar.  · Keep electrical cables out of the way.  · Do not use floor polish or wax that makes floors slippery. If you must use wax, make sure that it is non-skid floor wax.  What can I do in the stairways?  · Do not leave any items on the stairs.  · Make sure that you have a light switch at the top of the stairs and the bottom of the stairs. Have them installed if you do not have them.  · Make sure that there are handrails on both sides of the stairs. Fix handrails that are broken or loose. Make sure that handrails are as long as the stairways.  · Install non-slip stair treads on all stairs in your home.  · Avoid having throw rugs at the top or bottom of stairways, or secure the rugs with carpet tape to prevent them from moving.  · Choose a carpet design that does not hide the edge of steps on the stairway.  · Check any carpeting to make sure that it is firmly attached to the stairs. Fix any carpet that is loose or worn.  What can I do on the outside of my home?  · Use bright outdoor lighting.  · Regularly repair the edges of walkways and driveways and fix any cracks.  · Remove high doorway thresholds.  · Trim any shrubbery on the main path into your home.  · Regularly check that handrails are securely fastened and in good repair. Both sides of any steps should have handrails.  · Install guardrails along the edges of any raised decks or porches.  · Clear walkways of debris and clutter, including tools and rocks.  · Have leaves, snow, and ice cleared regularly.  · Use sand or salt on walkways during winter months.  · In the garage, clean up any spills right away, including grease or oil spills.  What other actions can I take?  · Wear closed-toe shoes that fit well and support your feet. Wear shoes that have rubber soles or low heels.  · Use mobility aids as needed, such as canes, walkers, scooters, and crutches.  · Review your medicines with your  health care provider. Some medicines can cause dizziness or changes in blood pressure, which increase your risk of falling.  Talk with your health care provider about other ways that you can decrease your risk of falls. This may include working with a physical therapist or  to improve your strength, balance, and endurance.  Where to find more information  · Centers for Disease Control and Prevention, [place]: https://www.cdc.gov  · National [place] on Aging: https://uq8bxwh.urmila.nih.gov  Contact a health care provider if:  · You are afraid of falling at home.  · You feel weak, drowsy, or dizzy at home.  · You fall at home.  Summary  · There are many simple things that you can do to make your home safe and to help prevent falls.  · Ways to make your home safe include removing tripping hazards and installing grab bars in the bathroom.  · Ask for help when making these changes in your home.  This information is not intended to replace advice given to you by your health care provider. Make sure you discuss any questions you have with your health care provider.  Document Released: 12/08/2003 Document Revised: 08/02/2018 Document Reviewed: 08/02/2018  [employer] Interactive Patient Education © 2019 [employer]      Exercising to Lose Weight  Exercise is structured, repetitive physical activity to improve fitness and health. Getting regular exercise is important for everyone. It is especially important if you are overweight. Being overweight increases your risk of heart disease, stroke, diabetes, high blood pressure, and several types of cancer. Reducing your calorie intake and exercising can help you lose weight.  Exercise is usually categorized as moderate or vigorous intensity. To lose weight, most people need to do a certain amount of moderate-intensity or vigorous-intensity exercise each week.  Moderate-intensity exercise    Moderate-intensity exercise is any activity that gets you moving enough to burn at  least three times more energy (calories) than if you were sitting.  Examples of moderate exercise include:  · Walking a mile in 15 minutes.  · Doing light yard work.  · Biking at an easy pace.  Most people should get at least 150 minutes (2 hours and 30 minutes) a week of moderate-intensity exercise to maintain their body weight.  Vigorous-intensity exercise  Vigorous-intensity exercise is any activity that gets you moving enough to burn at least six times more calories than if you were sitting. When you exercise at this intensity, you should be working hard enough that you are not able to carry on a conversation.  Examples of vigorous exercise include:  · Running.  · Playing a team sport, such as football, basketball, and soccer.  · Jumping rope.  Most people should get at least 75 minutes (1 hour and 15 minutes) a week of vigorous-intensity exercise to maintain their body weight.  How can exercise affect me?  When you exercise enough to burn more calories than you eat, you lose weight. Exercise also reduces body fat and builds muscle. The more muscle you have, the more calories you burn. Exercise also:  · Improves mood.  · Reduces stress and tension.  · Improves your overall fitness, flexibility, and endurance.  · Increases bone strength.  The amount of exercise you need to lose weight depends on:  · Your age.  · The type of exercise.  · Any health conditions you have.  · Your overall physical ability.  Talk to your health care provider about how much exercise you need and what types of activities are safe for you.  What actions can I take to lose weight?  Nutrition    · Make changes to your diet as told by your health care provider or diet and nutrition specialist (dietitian). This may include:  ? Eating fewer calories.  ? Eating more protein.  ? Eating less unhealthy fats.  ? Eating a diet that includes fresh fruits and vegetables, whole grains, low-fat dairy products, and lean protein.  ? Avoiding foods with  added fat, salt, and sugar.  · Drink plenty of water while you exercise to prevent dehydration or heat stroke.  Activity  · Choose an activity that you enjoy and set realistic goals. Your health care provider can help you make an exercise plan that works for you.  · Exercise at a moderate or vigorous intensity most days of the week.  ? The intensity of exercise may vary from person to person. You can tell how intense a workout is for you by paying attention to your breathing and heartbeat. Most people will notice their breathing and heartbeat get faster with more intense exercise.  · Do resistance training twice each week, such as:  ? Push-ups.  ? Sit-ups.  ? Lifting weights.  ? Using resistance bands.  · Getting short amounts of exercise can be just as helpful as long structured periods of exercise. If you have trouble finding time to exercise, try to include exercise in your daily routine.  ? Get up, stretch, and walk around every 30 minutes throughout the day.  ? Go for a walk during your lunch break.  ? Park your car farther away from your destination.  ? If you take public transportation, get off one stop early and walk the rest of the way.  ? Make phone calls while standing up and walking around.  ? Take the stairs instead of elevators or escalators.  · Wear comfortable clothes and shoes with good support.  · Do not exercise so much that you hurt yourself, feel dizzy, or get very short of breath.  Where to find more information  · [place] Department of Health and Human Services: www.hhs.gov  · Centers for Disease Control and Prevention (CDC): www.cdc.gov  Contact a health care provider:  · Before starting a new exercise program.  · If you have questions or concerns about your weight.  · If you have a medical problem that keeps you from exercising.  Get help right away if you have any of the following while exercising:  · Injury.  · Dizziness.  · Difficulty breathing or shortness of breath that does not go away  when you stop exercising.  · Chest pain.  · Rapid heartbeat.  Summary  · Being overweight increases your risk of heart disease, stroke, diabetes, high blood pressure, and several types of cancer.  · Losing weight happens when you burn more calories than you eat.  · Reducing the amount of calories you eat in addition to getting regular moderate or vigorous exercise each week helps you lose weight.  This information is not intended to replace advice given to you by your health care provider. Make sure you discuss any questions you have with your health care provider.  Document Released: 2012 Document Revised: 2018 Document Reviewed: 2018  [employer] Interactive Patient Education © 2019 [employer]      Medicare Wellness  Personal Prevention Plan of Service     Date of Office Visit:  2019  Encounter Provider:  Montse Cain PA-C  Place of Service:  [facility]  Patient Name: Prem Thrasher  :  1953    As part of the Medicare Wellness portion of your visit today, we are providing you with this personalized preventive plan of services (PPPS). This plan is based upon recommendations of the [place] Preventive Services Task Force (USPSTF) and the Advisory Committee on Immunization Practices (ACIP).    This lists the preventive care services that should be considered, and provides dates of when you are due. Items listed as completed are up-to-date and do not require any further intervention.    Health Maintenance   Topic Date Due   • LUNG CANCER SCREENING  2009   • MEDICARE ANNUAL WELLNESS  2019   • LIPID PANEL  2019   • PNEUMOCOCCAL VACCINES (65+ LOW/MEDIUM RISK) (1 of 2 - PCV13) 10/23/2019   • TDAP/TD VACCINES (1 - Tdap) 2020 (Originally 2003)   • ZOSTER VACCINE (1 of 2) 2020 (Originally 2004)   • COLONOSCOPY  2021   • AAA SCREEN (ONE-TIME)  Completed   • HEPATITIS C SCREENING  Addressed   • INFLUENZA  VACCINE  Discontinued       Orders Placed This Encounter   Procedures   • Comprehensive metabolic panel   • Lipid panel   • TSH   • T3, Free   • T4, Free   • Vitamin B12   • Folate   • Vitamin D 25 Hydroxy   • Hemoglobin A1c   • CBC and Differential     Order Specific Question:   Manual Differential     Answer:   No       Return in about 6 months (around 6/4/2020), or if symptoms worsen or fail to improve.

## 2023-11-03 DIAGNOSIS — A04.8 HELICOBACTER PYLORI INFECTION: Primary | ICD-10-CM

## 2023-11-03 RX ORDER — LANSOPRAZOLE 30 MG/1
30 CAPSULE, DELAYED RELEASE ORAL 2 TIMES DAILY
Qty: 20 CAPSULE | Refills: 0 | Status: SHIPPED | OUTPATIENT
Start: 2023-11-03

## 2023-11-03 RX ORDER — CLARITHROMYCIN 500 MG/1
500 TABLET, COATED ORAL 2 TIMES DAILY
Qty: 20 TABLET | Refills: 0 | Status: SHIPPED | OUTPATIENT
Start: 2023-11-03

## 2023-11-03 RX ORDER — AMOXICILLIN 500 MG/1
1000 CAPSULE ORAL 2 TIMES DAILY
Qty: 40 CAPSULE | Refills: 0 | Status: SHIPPED | OUTPATIENT
Start: 2023-11-03

## 2023-12-04 ENCOUNTER — TELEPHONE (OUTPATIENT)
Dept: INTERNAL MEDICINE | Facility: CLINIC | Age: 23
End: 2023-12-04

## 2023-12-04 NOTE — TELEPHONE ENCOUNTER
Caller: Johnny Alfredo    Relationship: Self    Best call back number: [phone]    What is the best time to reach you: ANYTIME    Who are you requesting to speak with (clinical staff, provider,  specific staff member): CLINICAL    What was the call regarding: STILL HAVING SYMPTOMS FROM H. PYLORI, WANTING TO LOOK INTO OTHER STEPS DUE TO MEDICATION BEING COMPLETED, OR IF NEEDS TO BE TESTED AGAIN.

## 2023-12-05 DIAGNOSIS — A04.8 HELICOBACTER PYLORI INFECTION: Primary | ICD-10-CM

## 2023-12-11 ENCOUNTER — LAB (OUTPATIENT)
Dept: INTERNAL MEDICINE | Facility: CLINIC | Age: 23
End: 2023-12-11
Payer: COMMERCIAL

## 2023-12-11 DIAGNOSIS — A04.8 HELICOBACTER PYLORI INFECTION: ICD-10-CM

## 2023-12-12 LAB — UREA BREATH TEST QL: NEGATIVE

## 2023-12-22 ENCOUNTER — OFFICE VISIT (OUTPATIENT)
Dept: INTERNAL MEDICINE | Facility: CLINIC | Age: 23
End: 2023-12-22
Payer: COMMERCIAL

## 2023-12-22 VITALS
HEART RATE: 68 BPM | HEIGHT: 68 IN | TEMPERATURE: 97.8 F | BODY MASS INDEX: 23.49 KG/M2 | OXYGEN SATURATION: 100 % | SYSTOLIC BLOOD PRESSURE: 143 MMHG | WEIGHT: 155 LBS | DIASTOLIC BLOOD PRESSURE: 93 MMHG | RESPIRATION RATE: 16 BRPM

## 2023-12-22 DIAGNOSIS — R10.13 EPIGASTRIC PAIN: Primary | ICD-10-CM

## 2023-12-22 DIAGNOSIS — R10.13 DYSPEPSIA: ICD-10-CM

## 2023-12-22 DIAGNOSIS — I10 HYPERTENSION, UNSPECIFIED TYPE: ICD-10-CM

## 2023-12-22 PROCEDURE — 99214 OFFICE O/P EST MOD 30 MIN: CPT | Performed by: NURSE PRACTITIONER

## 2023-12-22 RX ORDER — LANSOPRAZOLE 30 MG/1
30 CAPSULE, DELAYED RELEASE ORAL DAILY
Qty: 60 CAPSULE | Refills: 0 | Status: SHIPPED | OUTPATIENT
Start: 2023-12-22

## 2023-12-22 NOTE — PROGRESS NOTES
Subjective     Chief Complaint   Patient presents with    Heartburn       History of Present Illness  Johnny Alfredo presents to the clinic today for a follow-up visit to discuss acid reflux.    Mr. Alfredo continues to experience acid reflux symptoms. Previously, he tested positive for H. pylori and was treated for this. The patient did have follow-up breath testing performed and this was negative. Approximately 1 year ago, he began noticing acid reflux symptoms when eating pickles or mustard. The patient does report consuming spicy foods and tomato based products. He denies any dysphagia or feeling like food is getting stuck. He has been experiencing discomfort in his epigastric region. The patient denies having any difficulties with his bowel movements including diarrhea. He often wakes up and begins belching due to the acid. He denies taking any medications, but he has been taking Tums. He does feel that he would be able to tolerate taking 0.5 tablet of his blood pressure medication.      Review of Systems   Gastrointestinal:  Positive for abdominal pain.      Otherwise complete ROS reviewed and negative except as mentioned in the HPI.    Past Medical History:   Past Medical History:   Diagnosis Date    Hypertension      Past Surgical History:History reviewed. No pertinent surgical history.  Social History:  reports that he has never smoked. He has never used smokeless tobacco. He reports current alcohol use. He reports that he does not use drugs.    Family History: family history includes No Known Problems in his brother, father, and mother.       Allergies:  No Known Allergies  Medications:  Prior to Admission medications    Medication Sig Start Date End Date Taking? Authorizing Provider   clarithromycin (BIAXIN) 500 MG tablet Take 1 tablet by mouth 2 (Two) Times a Day.  Patient not taking: Reported on 12/22/2023 11/3/23   Angie Matute APRN   famotidine (Pepcid) 40 MG tablet Take 1 tablet by  "mouth Daily.  Patient not taking: Reported on 12/22/2023 10/24/23   Angie Matute APRN   imiquimod (Aldara) 5 % cream Apply 1 application topically to the appropriate area as directed 3 (Three) Times a Week. 4/7/23   Angie Matute APRN   lisinopril (PRINIVIL,ZESTRIL) 10 MG tablet TAKE 1 TABLET BY MOUTH EVERY DAY 10/3/22   Angie Matute APRN   valACYclovir (Valtrex) 1000 MG tablet Take 1 tablet by mouth 2 (Two) Times a Day. 2/7/23   Angie Matute APRN   amoxicillin (AMOXIL) 500 MG capsule Take 2 capsules by mouth 2 (Two) Times a Day. 11/3/23 12/22/23  Angie Matute APRN   lansoprazole (Prevacid) 30 MG capsule Take 1 capsule by mouth 2 (Two) Times a Day.  Patient not taking: Reported on 12/22/2023 11/3/23 12/22/23  Angie Matute APRN       Objective     Vital Signs: /93   Pulse 68   Temp 97.8 °F (36.6 °C)   Resp 16   Ht 172.7 cm (68\")   Wt 70.3 kg (155 lb)   SpO2 100%   BMI 23.57 kg/m²   Physical Exam  Constitutional:       Appearance: He is well-developed.   HENT:      Head: Normocephalic and atraumatic.   Eyes:      Conjunctiva/sclera: Conjunctivae normal.      Pupils: Pupils are equal, round, and reactive to light.   Neck:      Vascular: No JVD.   Cardiovascular:      Rate and Rhythm: Normal rate and regular rhythm.      Heart sounds: Normal heart sounds. No murmur heard.     No friction rub. No gallop.   Pulmonary:      Effort: Pulmonary effort is normal. No respiratory distress.      Breath sounds: Normal breath sounds. No wheezing or rales.   Chest:      Chest wall: No tenderness.   Abdominal:      General: Bowel sounds are normal. There is no distension.      Palpations: Abdomen is soft.      Tenderness: There is abdominal tenderness (epigastric). There is no guarding or rebound.   Musculoskeletal:         General: No tenderness or deformity. Normal range of motion.      Cervical back: Neck supple.   Skin:     General: Skin is warm " and dry.      Findings: No rash.   Neurological:      Mental Status: He is alert and oriented to person, place, and time.      Cranial Nerves: No cranial nerve deficit.      Motor: No abnormal muscle tone.      Deep Tendon Reflexes: Reflexes normal.   Psychiatric:         Behavior: Behavior normal.         Thought Content: Thought content normal.         Judgment: Judgment normal.         BMI is within normal parameters. No other follow-up for BMI required.      Results Reviewed:  Glucose   Date Value Ref Range Status   04/18/2022 87 65 - 99 mg/dL Final     BUN   Date Value Ref Range Status   04/18/2022 12 6 - 20 mg/dL Final     Creatinine   Date Value Ref Range Status   04/18/2022 0.71 (L) 0.76 - 1.27 mg/dL Final     Sodium   Date Value Ref Range Status   04/18/2022 139 134 - 144 mmol/L Final     Potassium   Date Value Ref Range Status   04/18/2022 4.3 3.5 - 5.2 mmol/L Final     Chloride   Date Value Ref Range Status   04/18/2022 99 96 - 106 mmol/L Final     Total CO2   Date Value Ref Range Status   04/18/2022 23 20 - 29 mmol/L Final     Calcium   Date Value Ref Range Status   04/18/2022 9.9 8.7 - 10.2 mg/dL Final     ALT (SGPT)   Date Value Ref Range Status   04/18/2022 30 0 - 44 IU/L Final     AST (SGOT)   Date Value Ref Range Status   04/18/2022 17 0 - 40 IU/L Final     WBC   Date Value Ref Range Status   04/18/2022 7.7 3.4 - 10.8 x10E3/uL Final     Hematocrit   Date Value Ref Range Status   04/18/2022 50.7 37.5 - 51.0 % Final     Platelets   Date Value Ref Range Status   04/18/2022 318 150 - 450 x10E3/uL Final         Assessment / Plan     Assessment/Plan:  Diagnoses and all orders for this visit:    1. Epigastric pain (Primary)  -     lansoprazole (Prevacid) 30 MG capsule; Take 1 capsule by mouth Daily.  Dispense: 60 capsule; Refill: 0    2. Dyspepsia  -     lansoprazole (Prevacid) 30 MG capsule; Take 1 capsule by mouth Daily.  Dispense: 60 capsule; Refill: 0    3. Hypertension, unspecified  type      Epigastric pain  Prevacid 30 mg has been prescribed for the patient. He was advised to take 1 tablet each morning and to avoid taking the medication on an empty stomach. The patient was advised to limit or refrain from consuming caffeine, citrus foods, acidic foods, tomatoes, oranges, and pineapples. He was made aware that alcohol, nicotine, and caffeine can cause his symptoms to worsen. The patient was also encouraged to refrain from eating within 3 to 4 hours before going to bed.    Dyspepsia  Prevacid 30 mg has been prescribed for the patient. He was advised to take 1 tablet each morning and to avoid taking the medication on an empty stomach. The patient was advised to limit or refrain from consuming caffeine, citrus foods, acidic foods, tomatoes, oranges, and pineapples. He was made aware that alcohol, nicotine, and caffeine can cause his symptoms to worsen. The patient was also encouraged to refrain from eating within 3 to 4 hours before going to bed.    Hypertension, unspecified type  The patient was encouraged to take 0.5 tablet of his hypertension medication daily.        Return in about 2 months (around 2/22/2024). unless patient needs to be seen sooner or acute issues arise.    Code Status: full    I have discussed the patient results/orders and and plan/recommendation with them at today's visit.      Transcribed from ambient dictation for LONG Serra by Angie Jasso.  12/22/23   09:59 CST    Patient or patient representative verbalized consent to the visit recording.  I have personally performed the services described in this document as transcribed by the above individual, and it is both accurate and complete.  LONG Serra  12/22/2023  12:53 CST

## 2023-12-26 DIAGNOSIS — I10 HYPERTENSION, UNSPECIFIED TYPE: ICD-10-CM

## 2023-12-27 RX ORDER — LISINOPRIL 10 MG/1
TABLET ORAL
Qty: 90 TABLET | Refills: 1 | Status: SHIPPED | OUTPATIENT
Start: 2023-12-27

## 2023-12-27 NOTE — TELEPHONE ENCOUNTER
Rx Refill Note  Requested Prescriptions     Pending Prescriptions Disp Refills    lisinopril (PRINIVIL,ZESTRIL) 10 MG tablet [Pharmacy Med Name: LISINOPRIL 10 MG TABLET] 90 tablet 1     Sig: TAKE 1 TABLET BY MOUTH EVERY DAY      Last office visit with prescribing clinician: 12/22/2023   Last telemedicine visit with prescribing clinician: Visit date not found   Next office visit with prescribing clinician: 2/22/2024                         Would you like a call back once the refill request has been completed: [] Yes [] No    If the office needs to give you a call back, can they leave a voicemail: [] Yes [] No    Becky Myers RN  12/27/23, 07:01 CST

## 2024-01-30 DIAGNOSIS — R10.13 EPIGASTRIC PAIN: ICD-10-CM

## 2024-01-30 DIAGNOSIS — R10.13 DYSPEPSIA: ICD-10-CM

## 2024-01-30 RX ORDER — LANSOPRAZOLE 30 MG/1
30 CAPSULE, DELAYED RELEASE ORAL DAILY
Qty: 60 CAPSULE | Refills: 6 | Status: SHIPPED | OUTPATIENT
Start: 2024-01-30

## 2024-01-30 NOTE — TELEPHONE ENCOUNTER
Rx Refill Note  Requested Prescriptions     Pending Prescriptions Disp Refills    lansoprazole (Prevacid) 30 MG capsule 60 capsule 0     Sig: Take 1 capsule by mouth Daily.      Last office visit with prescribing clinician: 12/22/2023   Last telemedicine visit with prescribing clinician: Visit date not found   Next office visit with prescribing clinician: 2/22/2024                         Would you like a call back once the refill request has been completed: [] Yes [] No    If the office needs to give you a call back, can they leave a voicemail: [] Yes [] No    Becky Myers RN  01/30/24, 07:04 CST

## 2024-06-05 DIAGNOSIS — I10 HYPERTENSION, UNSPECIFIED TYPE: ICD-10-CM

## 2024-06-05 RX ORDER — LISINOPRIL 10 MG/1
10 TABLET ORAL DAILY
Qty: 90 TABLET | Refills: 1 | Status: SHIPPED | OUTPATIENT
Start: 2024-06-05

## 2024-06-05 NOTE — TELEPHONE ENCOUNTER
Rx Refill Note  Requested Prescriptions     Pending Prescriptions Disp Refills    lisinopril (PRINIVIL,ZESTRIL) 10 MG tablet 90 tablet 1     Sig: Take 1 tablet by mouth Daily.      Last office visit with prescribing clinician: 12/22/2023   Last telemedicine visit with prescribing clinician: Visit date not found   Next office visit with prescribing clinician: Visit date not found                         Would you like a call back once the refill request has been completed: [] Yes [] No    If the office needs to give you a call back, can they leave a voicemail: [] Yes [] No    Becky Myers RN  06/05/24, 07:23 CDT

## 2024-07-17 DIAGNOSIS — R10.13 EPIGASTRIC PAIN: ICD-10-CM

## 2024-07-17 DIAGNOSIS — R10.13 DYSPEPSIA: ICD-10-CM

## 2024-07-17 RX ORDER — LANSOPRAZOLE 30 MG/1
30 CAPSULE, DELAYED RELEASE ORAL DAILY
Qty: 60 CAPSULE | Refills: 6 | Status: SHIPPED | OUTPATIENT
Start: 2024-07-17

## 2024-07-17 NOTE — TELEPHONE ENCOUNTER
When do you want to see patient?     Rx Refill Note  Requested Prescriptions     Pending Prescriptions Disp Refills    lansoprazole (Prevacid) 30 MG capsule 60 capsule 6     Sig: Take 1 capsule by mouth Daily.      Last office visit with prescribing clinician: 12/22/2023   Last telemedicine visit with prescribing clinician: Visit date not found   Next office visit with prescribing clinician: Visit date not found                         Would you like a call back once the refill request has been completed: [] Yes [] No    If the office needs to give you a call back, can they leave a voicemail: [] Yes [] No    Becky Myers RN  07/17/24, 07:02 CDT

## 2025-01-01 DIAGNOSIS — I10 HYPERTENSION, UNSPECIFIED TYPE: ICD-10-CM

## 2025-01-02 RX ORDER — LISINOPRIL 10 MG/1
10 TABLET ORAL DAILY
Qty: 90 TABLET | Refills: 1 | OUTPATIENT
Start: 2025-01-02